# Patient Record
Sex: FEMALE | Race: WHITE | HISPANIC OR LATINO | URBAN - METROPOLITAN AREA
[De-identification: names, ages, dates, MRNs, and addresses within clinical notes are randomized per-mention and may not be internally consistent; named-entity substitution may affect disease eponyms.]

---

## 2023-02-04 ENCOUNTER — OUTPATIENT (OUTPATIENT)
Dept: OUTPATIENT SERVICES | Facility: HOSPITAL | Age: 58
LOS: 1 days | End: 2023-02-04
Payer: COMMERCIAL

## 2023-02-04 ENCOUNTER — APPOINTMENT (OUTPATIENT)
Dept: RADIOLOGY | Facility: CLINIC | Age: 58
End: 2023-02-04
Payer: COMMERCIAL

## 2023-02-04 ENCOUNTER — APPOINTMENT (OUTPATIENT)
Dept: MAMMOGRAPHY | Facility: CLINIC | Age: 58
End: 2023-02-04
Payer: COMMERCIAL

## 2023-02-04 DIAGNOSIS — Z12.31 ENCOUNTER FOR SCREENING MAMMOGRAM FOR MALIGNANT NEOPLASM OF BREAST: ICD-10-CM

## 2023-02-04 PROCEDURE — 77063 BREAST TOMOSYNTHESIS BI: CPT | Mod: 26

## 2023-02-04 PROCEDURE — 77067 SCR MAMMO BI INCL CAD: CPT

## 2023-02-04 PROCEDURE — 77063 BREAST TOMOSYNTHESIS BI: CPT

## 2023-02-04 PROCEDURE — 77067 SCR MAMMO BI INCL CAD: CPT | Mod: 26

## 2023-08-07 ENCOUNTER — APPOINTMENT (OUTPATIENT)
Dept: OTOLARYNGOLOGY | Facility: CLINIC | Age: 58
End: 2023-08-07
Payer: COMMERCIAL

## 2023-08-07 VITALS
SYSTOLIC BLOOD PRESSURE: 110 MMHG | BODY MASS INDEX: 21.6 KG/M2 | HEART RATE: 70 BPM | DIASTOLIC BLOOD PRESSURE: 70 MMHG | HEIGHT: 60 IN | WEIGHT: 110 LBS

## 2023-08-07 DIAGNOSIS — J35.8 OTHER CHRONIC DISEASES OF TONSILS AND ADENOIDS: ICD-10-CM

## 2023-08-07 DIAGNOSIS — J34.2 DEVIATED NASAL SEPTUM: ICD-10-CM

## 2023-08-07 DIAGNOSIS — R09.81 NASAL CONGESTION: ICD-10-CM

## 2023-08-07 DIAGNOSIS — R19.6 HALITOSIS: ICD-10-CM

## 2023-08-07 PROCEDURE — 99203 OFFICE O/P NEW LOW 30 MIN: CPT | Mod: 25

## 2023-08-07 PROCEDURE — 31231 NASAL ENDOSCOPY DX: CPT

## 2023-08-07 RX ORDER — FLUTICASONE PROPIONATE 50 UG/1
50 SPRAY, METERED NASAL DAILY
Qty: 1 | Refills: 2 | Status: ACTIVE | COMMUNITY
Start: 2023-08-07 | End: 1900-01-01

## 2023-08-07 RX ORDER — SOD CHLOR,BICARB/SQUEEZ BOTTLE
PACKET, WITH RINSE DEVICE NASAL
Qty: 1 | Refills: 3 | Status: ACTIVE | COMMUNITY
Start: 2023-08-07 | End: 1900-01-01

## 2023-08-11 PROBLEM — J35.8 TONSILLOLITH: Status: ACTIVE | Noted: 2023-08-11

## 2023-08-11 PROBLEM — J34.2 NASAL SEPTAL DEVIATION: Status: ACTIVE | Noted: 2023-08-11

## 2023-08-11 NOTE — HISTORY OF PRESENT ILLNESS
[de-identified] : 57Y F present for Bad mouth odor for multiple years.  Was seen previously in the past by Dr. Packer recommended for PPI which she tried without improvement.  Went to the Dentist.  Has not tried waterpik Patient also states she has nasal congestion has been using sinus rinse with some relief. Patient denies any recurrent nasal infections, fevers, chills    referred by PCP to follow up for a lump in left side nostril, bad mouth odor

## 2023-08-11 NOTE — ASSESSMENT
[FreeTextEntry1] : 57Y F present nasal congestion 2/2 nasal septum deviation, Tonsil stone. Patient denies recurrent history of throat infections.  Nasal endoscopy shows left DNS, Moderate Inferior Turbinates Hypertrophy, No polyps, purulence or masses, Posterior Nasal pharynx Cobblestone/Clear Drainage, Mild postcricoid edema, Mild arytenoids edema  Recommend: Bad Odor 2/2 Tonsil Stones - Discussed brushing and floss regularly. Make sure to brush the front and back of your tongue, too. Quit smoking.Gargle with salt water after eating. -Trial of using a water pick to rinse tonsil and help dislodge any tonsil stones. -Stay hydrated by drinking plenty of water.  Nasal Congestion -Discussed the importance of rinsing the sinus before using nasal spray so that excess mucus lining the nasal cavity can be wash away so medication can penetration the nose. -Send to pharmacy Flonase nasal spray to be used after completing daily Sinus Rinse -If normal saline sinus rinse + nasal spray is not effective can discuss medicated nasal rinses and imaging for additional evaluation  Nasal septal deviation -Discussed deviated septum occurs when the thin wall between your nasal passages is displaced to one side.When the nasal septum is off-center it makes one nasal passage smaller. -If a deviated septum is severe, it can block one side of the nose and reduce airflow, causing difficulty breathing. -The exposure of a deviated septum to the drying effect of airflow through the nose may sometimes contribute to crusting or bleeding in certain people. -Treatment of nasal obstruction includes nasal steroids to reduce the swelling. -If conservative medical management is not effective correction of the deviated septum through surgery may be needed.  Return to clinic in 3 months or sooner if new/worsen symptoms present

## 2023-08-11 NOTE — PHYSICAL EXAM
[Nasal Endoscopy Performed] : nasal endoscopy was performed, see procedure section for findings [Normal] : mucosa is normal [Midline] : trachea located in midline position [de-identified] : Tonsil 1+

## 2023-08-11 NOTE — REASON FOR VISIT
[Initial Consultation] : an initial consultation for [FreeTextEntry2] : referred by PCP to follow up for a bad mouth odor [Interpreters_IDNumber] : [id number] [Interpreters_FullName] : Richie [TWNoteComboBox1] : [place]

## 2023-09-14 ENCOUNTER — APPOINTMENT (OUTPATIENT)
Dept: ULTRASOUND IMAGING | Facility: CLINIC | Age: 58
End: 2023-09-14
Payer: COMMERCIAL

## 2023-09-14 ENCOUNTER — OUTPATIENT (OUTPATIENT)
Dept: OUTPATIENT SERVICES | Facility: HOSPITAL | Age: 58
LOS: 1 days | End: 2023-09-14
Payer: COMMERCIAL

## 2023-09-14 DIAGNOSIS — R10.12 LEFT UPPER QUADRANT PAIN: ICD-10-CM

## 2023-09-14 PROCEDURE — 76700 US EXAM ABDOM COMPLETE: CPT | Mod: 26

## 2023-09-14 PROCEDURE — 76700 US EXAM ABDOM COMPLETE: CPT

## 2024-02-01 ENCOUNTER — RESULT REVIEW (OUTPATIENT)
Age: 59
End: 2024-02-01

## 2024-02-01 ENCOUNTER — APPOINTMENT (OUTPATIENT)
Dept: GASTROENTEROLOGY | Facility: AMBULATORY MEDICAL SERVICES | Age: 59
End: 2024-02-01
Payer: COMMERCIAL

## 2024-02-01 PROCEDURE — 45378 DIAGNOSTIC COLONOSCOPY: CPT | Mod: 33

## 2024-02-01 PROCEDURE — 43239 EGD BIOPSY SINGLE/MULTIPLE: CPT

## 2024-02-06 RX ORDER — AMOXICILLIN 500 MG/1
500 TABLET, FILM COATED ORAL
Qty: 58 | Refills: 0 | Status: ACTIVE | COMMUNITY
Start: 2024-02-06 | End: 1900-01-01

## 2024-02-06 RX ORDER — OMEPRAZOLE 20 MG/1
20 TABLET, DELAYED RELEASE ORAL
Qty: 28 | Refills: 0 | Status: ACTIVE | COMMUNITY
Start: 2024-02-06 | End: 1900-01-01

## 2024-02-06 RX ORDER — CLARITHROMYCIN 500 MG/1
500 TABLET, FILM COATED ORAL TWICE DAILY
Qty: 28 | Refills: 0 | Status: ACTIVE | COMMUNITY
Start: 2024-02-06 | End: 1900-01-01

## 2024-03-13 DIAGNOSIS — A04.8 OTHER SPECIFIED BACTERIAL INTESTINAL INFECTIONS: ICD-10-CM

## 2024-03-15 ENCOUNTER — TRANSCRIPTION ENCOUNTER (OUTPATIENT)
Age: 59
End: 2024-03-15